# Patient Record
Sex: MALE | Race: BLACK OR AFRICAN AMERICAN | NOT HISPANIC OR LATINO | URBAN - METROPOLITAN AREA
[De-identification: names, ages, dates, MRNs, and addresses within clinical notes are randomized per-mention and may not be internally consistent; named-entity substitution may affect disease eponyms.]

---

## 2022-11-21 ENCOUNTER — LAB VISIT (OUTPATIENT)
Dept: LAB | Facility: HOSPITAL | Age: 32
End: 2022-11-21
Attending: FAMILY MEDICINE
Payer: COMMERCIAL

## 2022-11-21 ENCOUNTER — OFFICE VISIT (OUTPATIENT)
Dept: INTERNAL MEDICINE | Facility: CLINIC | Age: 32
End: 2022-11-21
Payer: COMMERCIAL

## 2022-11-21 VITALS
BODY MASS INDEX: 45.52 KG/M2 | RESPIRATION RATE: 18 BRPM | OXYGEN SATURATION: 99 % | HEART RATE: 66 BPM | HEIGHT: 69 IN | SYSTOLIC BLOOD PRESSURE: 164 MMHG | DIASTOLIC BLOOD PRESSURE: 108 MMHG | WEIGHT: 307.31 LBS

## 2022-11-21 DIAGNOSIS — Z00.00 ROUTINE PHYSICAL EXAMINATION: ICD-10-CM

## 2022-11-21 DIAGNOSIS — Z00.00 ROUTINE PHYSICAL EXAMINATION: Primary | ICD-10-CM

## 2022-11-21 DIAGNOSIS — Z11.59 ENCOUNTER FOR HEPATITIS C SCREENING TEST FOR LOW RISK PATIENT: ICD-10-CM

## 2022-11-21 DIAGNOSIS — Z00.00 ENCOUNTER FOR MEDICAL EXAMINATION TO ESTABLISH CARE: ICD-10-CM

## 2022-11-21 DIAGNOSIS — Z11.4 ENCOUNTER FOR SCREENING FOR HIV: ICD-10-CM

## 2022-11-21 DIAGNOSIS — I10 HYPERTENSION, UNSPECIFIED TYPE: ICD-10-CM

## 2022-11-21 DIAGNOSIS — Z23 NEED FOR TETANUS BOOSTER: ICD-10-CM

## 2022-11-21 LAB
ALBUMIN SERPL BCP-MCNC: 3.9 G/DL (ref 3.5–5.2)
ALP SERPL-CCNC: 70 U/L (ref 55–135)
ALT SERPL W/O P-5'-P-CCNC: 23 U/L (ref 10–44)
ANION GAP SERPL CALC-SCNC: 6 MMOL/L (ref 8–16)
AST SERPL-CCNC: 23 U/L (ref 10–40)
BASOPHILS # BLD AUTO: 0.06 K/UL (ref 0–0.2)
BASOPHILS NFR BLD: 1.1 % (ref 0–1.9)
BILIRUB SERPL-MCNC: 0.5 MG/DL (ref 0.1–1)
BUN SERPL-MCNC: 10 MG/DL (ref 6–20)
CALCIUM SERPL-MCNC: 9.1 MG/DL (ref 8.7–10.5)
CHLORIDE SERPL-SCNC: 107 MMOL/L (ref 95–110)
CHOLEST SERPL-MCNC: 184 MG/DL (ref 120–199)
CHOLEST/HDLC SERPL: 4.4 {RATIO} (ref 2–5)
CO2 SERPL-SCNC: 27 MMOL/L (ref 23–29)
CREAT SERPL-MCNC: 1 MG/DL (ref 0.5–1.4)
DIFFERENTIAL METHOD: ABNORMAL
EOSINOPHIL # BLD AUTO: 0.1 K/UL (ref 0–0.5)
EOSINOPHIL NFR BLD: 2.6 % (ref 0–8)
ERYTHROCYTE [DISTWIDTH] IN BLOOD BY AUTOMATED COUNT: 13.2 % (ref 11.5–14.5)
EST. GFR  (NO RACE VARIABLE): >60 ML/MIN/1.73 M^2
ESTIMATED AVG GLUCOSE: 111 MG/DL (ref 68–131)
GLUCOSE SERPL-MCNC: 87 MG/DL (ref 70–110)
HBA1C MFR BLD: 5.5 % (ref 4–5.6)
HCT VFR BLD AUTO: 42.6 % (ref 40–54)
HCV AB SERPL QL IA: NORMAL
HDLC SERPL-MCNC: 42 MG/DL (ref 40–75)
HDLC SERPL: 22.8 % (ref 20–50)
HGB BLD-MCNC: 13.8 G/DL (ref 14–18)
HIV 1+2 AB+HIV1 P24 AG SERPL QL IA: NORMAL
IMM GRANULOCYTES # BLD AUTO: 0.01 K/UL (ref 0–0.04)
IMM GRANULOCYTES NFR BLD AUTO: 0.2 % (ref 0–0.5)
LDLC SERPL CALC-MCNC: 130 MG/DL (ref 63–159)
LYMPHOCYTES # BLD AUTO: 2.1 K/UL (ref 1–4.8)
LYMPHOCYTES NFR BLD: 39.1 % (ref 18–48)
MCH RBC QN AUTO: 30 PG (ref 27–31)
MCHC RBC AUTO-ENTMCNC: 32.4 G/DL (ref 32–36)
MCV RBC AUTO: 93 FL (ref 82–98)
MONOCYTES # BLD AUTO: 0.4 K/UL (ref 0.3–1)
MONOCYTES NFR BLD: 8.1 % (ref 4–15)
NEUTROPHILS # BLD AUTO: 2.6 K/UL (ref 1.8–7.7)
NEUTROPHILS NFR BLD: 48.9 % (ref 38–73)
NONHDLC SERPL-MCNC: 142 MG/DL
NRBC BLD-RTO: 0 /100 WBC
PLATELET # BLD AUTO: 447 K/UL (ref 150–450)
PMV BLD AUTO: 9.9 FL (ref 9.2–12.9)
POTASSIUM SERPL-SCNC: 3.8 MMOL/L (ref 3.5–5.1)
PROT SERPL-MCNC: 7.6 G/DL (ref 6–8.4)
RBC # BLD AUTO: 4.6 M/UL (ref 4.6–6.2)
SODIUM SERPL-SCNC: 140 MMOL/L (ref 136–145)
TRIGL SERPL-MCNC: 60 MG/DL (ref 30–150)
WBC # BLD AUTO: 5.3 K/UL (ref 3.9–12.7)

## 2022-11-21 PROCEDURE — 85025 COMPLETE CBC W/AUTO DIFF WBC: CPT | Performed by: FAMILY MEDICINE

## 2022-11-21 PROCEDURE — 90714 TD VACC NO PRESV 7 YRS+ IM: CPT | Mod: S$GLB,,, | Performed by: FAMILY MEDICINE

## 2022-11-21 PROCEDURE — 99395 PREV VISIT EST AGE 18-39: CPT | Mod: 25,S$GLB,, | Performed by: FAMILY MEDICINE

## 2022-11-21 PROCEDURE — 90471 IMMUNIZATION ADMIN: CPT | Mod: S$GLB,,, | Performed by: FAMILY MEDICINE

## 2022-11-21 PROCEDURE — 3008F BODY MASS INDEX DOCD: CPT | Mod: CPTII,S$GLB,, | Performed by: FAMILY MEDICINE

## 2022-11-21 PROCEDURE — 90471 TD VACCINE GREATER THAN OR EQUAL TO 7YO PRESERVATIVE FREE IM: ICD-10-PCS | Mod: S$GLB,,, | Performed by: FAMILY MEDICINE

## 2022-11-21 PROCEDURE — 1159F PR MEDICATION LIST DOCUMENTED IN MEDICAL RECORD: ICD-10-PCS | Mod: CPTII,S$GLB,, | Performed by: FAMILY MEDICINE

## 2022-11-21 PROCEDURE — 3080F PR MOST RECENT DIASTOLIC BLOOD PRESSURE >= 90 MM HG: ICD-10-PCS | Mod: CPTII,S$GLB,, | Performed by: FAMILY MEDICINE

## 2022-11-21 PROCEDURE — 3008F PR BODY MASS INDEX (BMI) DOCUMENTED: ICD-10-PCS | Mod: CPTII,S$GLB,, | Performed by: FAMILY MEDICINE

## 2022-11-21 PROCEDURE — 36415 COLL VENOUS BLD VENIPUNCTURE: CPT | Performed by: FAMILY MEDICINE

## 2022-11-21 PROCEDURE — 3077F SYST BP >= 140 MM HG: CPT | Mod: CPTII,S$GLB,, | Performed by: FAMILY MEDICINE

## 2022-11-21 PROCEDURE — 3080F DIAST BP >= 90 MM HG: CPT | Mod: CPTII,S$GLB,, | Performed by: FAMILY MEDICINE

## 2022-11-21 PROCEDURE — 80061 LIPID PANEL: CPT | Performed by: FAMILY MEDICINE

## 2022-11-21 PROCEDURE — 3044F PR MOST RECENT HEMOGLOBIN A1C LEVEL <7.0%: ICD-10-PCS | Mod: CPTII,S$GLB,, | Performed by: FAMILY MEDICINE

## 2022-11-21 PROCEDURE — 3044F HG A1C LEVEL LT 7.0%: CPT | Mod: CPTII,S$GLB,, | Performed by: FAMILY MEDICINE

## 2022-11-21 PROCEDURE — 1159F MED LIST DOCD IN RCRD: CPT | Mod: CPTII,S$GLB,, | Performed by: FAMILY MEDICINE

## 2022-11-21 PROCEDURE — 86803 HEPATITIS C AB TEST: CPT | Performed by: FAMILY MEDICINE

## 2022-11-21 PROCEDURE — 90714 TD VACCINE GREATER THAN OR EQUAL TO 7YO PRESERVATIVE FREE IM: ICD-10-PCS | Mod: S$GLB,,, | Performed by: FAMILY MEDICINE

## 2022-11-21 PROCEDURE — 99999 PR PBB SHADOW E&M-NEW PATIENT-LVL III: ICD-10-PCS | Mod: PBBFAC,,, | Performed by: FAMILY MEDICINE

## 2022-11-21 PROCEDURE — 80053 COMPREHEN METABOLIC PANEL: CPT | Performed by: FAMILY MEDICINE

## 2022-11-21 PROCEDURE — 99999 PR PBB SHADOW E&M-NEW PATIENT-LVL III: CPT | Mod: PBBFAC,,, | Performed by: FAMILY MEDICINE

## 2022-11-21 PROCEDURE — 87389 HIV-1 AG W/HIV-1&-2 AB AG IA: CPT | Performed by: FAMILY MEDICINE

## 2022-11-21 PROCEDURE — 83036 HEMOGLOBIN GLYCOSYLATED A1C: CPT | Performed by: FAMILY MEDICINE

## 2022-11-21 PROCEDURE — 99395 PR PREVENTIVE VISIT,EST,18-39: ICD-10-PCS | Mod: 25,S$GLB,, | Performed by: FAMILY MEDICINE

## 2022-11-21 PROCEDURE — 3077F PR MOST RECENT SYSTOLIC BLOOD PRESSURE >= 140 MM HG: ICD-10-PCS | Mod: CPTII,S$GLB,, | Performed by: FAMILY MEDICINE

## 2022-12-15 NOTE — PROGRESS NOTES
Subjective:       Patient ID: Corona Hutchinson is a 32 y.o. male.    Chief Complaint: Establish Care    HPI Mr. Hutchinson presents today to establish care.     Notes elevated blood pressure in the past   Last month 135/75     Has been physically active on a regular basis but not recently     Feels good when he is working out regularly       Review of Systems   Constitutional:  Negative for activity change, appetite change, fatigue and fever.   HENT:  Negative for congestion, ear pain, facial swelling, hearing loss, sore throat and tinnitus.    Eyes:  Negative for redness and visual disturbance.   Respiratory:  Negative for cough, chest tightness and wheezing.    Gastrointestinal:  Negative for abdominal distention, abdominal pain, constipation, diarrhea, nausea and vomiting.   Endocrine: Negative for polydipsia and polyuria.   Genitourinary:  Negative for flank pain, frequency and penile discharge.   Musculoskeletal:  Negative for back pain, gait problem and joint swelling.   Skin:  Negative for rash.   Neurological:  Negative for dizziness, tremors, seizures, weakness and headaches.   Psychiatric/Behavioral:  Negative for agitation and confusion.          Past Medical History:   Diagnosis Date    Amblyopia      Past Surgical History:   Procedure Laterality Date    REDUCTION OF TESTICULAR TORSION       Family History   Problem Relation Age of Onset    Hypertension Mother      Social History     Socioeconomic History    Marital status:    Tobacco Use    Smoking status: Never    Smokeless tobacco: Never   Substance and Sexual Activity    Alcohol use: Not Currently     Comment: seldom    Drug use: Never    Sexual activity: Yes       Objective:        Physical Exam  Vitals reviewed.   Constitutional:       Appearance: He is obese. He is not ill-appearing.   HENT:      Head: Normocephalic and atraumatic.      Right Ear: External ear normal.      Left Ear: External ear normal.   Eyes:      General: No scleral icterus.         Right eye: No discharge.         Left eye: No discharge.      Pupils: Pupils are equal, round, and reactive to light.   Neck:      Thyroid: No thyromegaly.   Cardiovascular:      Rate and Rhythm: Normal rate and regular rhythm.      Heart sounds: Normal heart sounds. No murmur heard.  Pulmonary:      Effort: Pulmonary effort is normal. No respiratory distress.      Breath sounds: Normal breath sounds. No wheezing.   Abdominal:      General: Bowel sounds are normal. There is no distension.      Palpations: Abdomen is soft.      Tenderness: There is no abdominal tenderness.   Musculoskeletal:         General: Normal range of motion.      Cervical back: Normal range of motion and neck supple.   Skin:     General: Skin is warm.      Findings: No erythema or rash.   Neurological:      Mental Status: He is alert and oriented to person, place, and time. Mental status is at baseline.      Coordination: Coordination normal.      Deep Tendon Reflexes: Reflexes are normal and symmetric.   Psychiatric:         Mood and Affect: Mood normal.         Behavior: Behavior normal.         No results found for this or any previous visit.    Assessment/Plan:     Routine physical examination  -     Lipid Panel; Future; Expected date: 11/21/2022  -     COMPREHENSIVE METABOLIC PANEL; Future; Expected date: 11/21/2022  -     CBC Auto Differential; Future; Expected date: 11/21/2022  -     HEMOGLOBIN A1C; Future; Expected date: 11/21/2022    Encounter for screening for HIV  -     HIV 1 / 2 ANTIBODY; Future; Expected date: 11/21/2022    Encounter for hepatitis C screening test for low risk patient  -     Hepatitis C Antibody; Future; Expected date: 11/21/2022    Need for tetanus booster  -     (In Office Administered) Td Vaccine - Preservative Free    Encounter for medical examination to establish care    Hypertension, unspecified type    Suggest improving diet and discussed DASH diet today. The DASH diet incorporates eating vegetables,  fruits, and whole grains Including fat-free or low-fat dairy products, fish, poultry, beans, nuts, and vegetable oils. Limiting foods that are high in saturated fat, such as fatty meats, full-fat dairy products, and tropical oils such as coconut oil. Limiting sugar-sweetened beverages and sweets. Target BP is less than 140/90 if BP running higher than this please schedule an appointment to be seen. You may consider getting BP monitor at home or visiting local pharmacy to check BP at least 1 to 2 times a week.       Follow up 2 week nurse visit    Lainey Belle MD  ON   Family Medicine       Satisfactory

## 2025-02-05 ENCOUNTER — OFFICE VISIT (OUTPATIENT)
Dept: INTERNAL MEDICINE | Facility: CLINIC | Age: 35
End: 2025-02-05
Payer: COMMERCIAL

## 2025-02-05 ENCOUNTER — LAB VISIT (OUTPATIENT)
Dept: LAB | Facility: HOSPITAL | Age: 35
End: 2025-02-05
Payer: COMMERCIAL

## 2025-02-05 VITALS
OXYGEN SATURATION: 98 % | HEART RATE: 88 BPM | BODY MASS INDEX: 49.68 KG/M2 | DIASTOLIC BLOOD PRESSURE: 98 MMHG | WEIGHT: 315 LBS | TEMPERATURE: 98 F | SYSTOLIC BLOOD PRESSURE: 160 MMHG

## 2025-02-05 DIAGNOSIS — I10 UNCONTROLLED HYPERTENSION: Primary | ICD-10-CM

## 2025-02-05 DIAGNOSIS — I10 ESSENTIAL HYPERTENSION: ICD-10-CM

## 2025-02-05 DIAGNOSIS — Z79.899 OTHER LONG TERM (CURRENT) DRUG THERAPY: ICD-10-CM

## 2025-02-05 LAB
ALBUMIN SERPL BCP-MCNC: 3.9 G/DL (ref 3.5–5.2)
ALP SERPL-CCNC: 62 U/L (ref 40–150)
ALT SERPL W/O P-5'-P-CCNC: 25 U/L (ref 10–44)
ANION GAP SERPL CALC-SCNC: 10 MMOL/L (ref 8–16)
AST SERPL-CCNC: 27 U/L (ref 10–40)
BASOPHILS # BLD AUTO: 0.07 K/UL (ref 0–0.2)
BASOPHILS NFR BLD: 1.4 % (ref 0–1.9)
BILIRUB SERPL-MCNC: 0.5 MG/DL (ref 0.1–1)
BUN SERPL-MCNC: 15 MG/DL (ref 6–20)
CALCIUM SERPL-MCNC: 9.4 MG/DL (ref 8.7–10.5)
CHLORIDE SERPL-SCNC: 107 MMOL/L (ref 95–110)
CHOLEST SERPL-MCNC: 185 MG/DL (ref 120–199)
CHOLEST/HDLC SERPL: 5.8 {RATIO} (ref 2–5)
CO2 SERPL-SCNC: 25 MMOL/L (ref 23–29)
CREAT SERPL-MCNC: 1.2 MG/DL (ref 0.5–1.4)
DIFFERENTIAL METHOD BLD: ABNORMAL
EOSINOPHIL # BLD AUTO: 0.3 K/UL (ref 0–0.5)
EOSINOPHIL NFR BLD: 5 % (ref 0–8)
ERYTHROCYTE [DISTWIDTH] IN BLOOD BY AUTOMATED COUNT: 12.8 % (ref 11.5–14.5)
EST. GFR  (NO RACE VARIABLE): >60 ML/MIN/1.73 M^2
ESTIMATED AVG GLUCOSE: 114 MG/DL (ref 68–131)
GLUCOSE SERPL-MCNC: 88 MG/DL (ref 70–110)
HBA1C MFR BLD: 5.6 % (ref 4–5.6)
HCT VFR BLD AUTO: 41.8 % (ref 40–54)
HDLC SERPL-MCNC: 32 MG/DL (ref 40–75)
HDLC SERPL: 17.3 % (ref 20–50)
HGB BLD-MCNC: 13.9 G/DL (ref 14–18)
IMM GRANULOCYTES # BLD AUTO: 0.01 K/UL (ref 0–0.04)
IMM GRANULOCYTES NFR BLD AUTO: 0.2 % (ref 0–0.5)
LDLC SERPL CALC-MCNC: 123.2 MG/DL (ref 63–159)
LYMPHOCYTES # BLD AUTO: 1.7 K/UL (ref 1–4.8)
LYMPHOCYTES NFR BLD: 33.7 % (ref 18–48)
MCH RBC QN AUTO: 30.8 PG (ref 27–31)
MCHC RBC AUTO-ENTMCNC: 33.3 G/DL (ref 32–36)
MCV RBC AUTO: 93 FL (ref 82–98)
MONOCYTES # BLD AUTO: 0.8 K/UL (ref 0.3–1)
MONOCYTES NFR BLD: 15.3 % (ref 4–15)
NEUTROPHILS # BLD AUTO: 2.2 K/UL (ref 1.8–7.7)
NEUTROPHILS NFR BLD: 44.4 % (ref 38–73)
NONHDLC SERPL-MCNC: 153 MG/DL
NRBC BLD-RTO: 0 /100 WBC
PLATELET # BLD AUTO: 406 K/UL (ref 150–450)
PMV BLD AUTO: 9.8 FL (ref 9.2–12.9)
POTASSIUM SERPL-SCNC: 3.5 MMOL/L (ref 3.5–5.1)
PROT SERPL-MCNC: 8.1 G/DL (ref 6–8.4)
RBC # BLD AUTO: 4.51 M/UL (ref 4.6–6.2)
SODIUM SERPL-SCNC: 142 MMOL/L (ref 136–145)
TRIGL SERPL-MCNC: 149 MG/DL (ref 30–150)
TSH SERPL DL<=0.005 MIU/L-ACNC: 0.96 UIU/ML (ref 0.4–4)
WBC # BLD AUTO: 5.02 K/UL (ref 3.9–12.7)

## 2025-02-05 PROCEDURE — 80053 COMPREHEN METABOLIC PANEL: CPT

## 2025-02-05 PROCEDURE — 4010F ACE/ARB THERAPY RXD/TAKEN: CPT | Mod: CPTII,S$GLB,,

## 2025-02-05 PROCEDURE — G2211 COMPLEX E/M VISIT ADD ON: HCPCS | Mod: S$GLB,,,

## 2025-02-05 PROCEDURE — 3080F DIAST BP >= 90 MM HG: CPT | Mod: CPTII,S$GLB,,

## 2025-02-05 PROCEDURE — 99999 PR PBB SHADOW E&M-EST. PATIENT-LVL III: CPT | Mod: PBBFAC,,,

## 2025-02-05 PROCEDURE — 3044F HG A1C LEVEL LT 7.0%: CPT | Mod: CPTII,S$GLB,,

## 2025-02-05 PROCEDURE — 36415 COLL VENOUS BLD VENIPUNCTURE: CPT | Mod: PO

## 2025-02-05 PROCEDURE — 83036 HEMOGLOBIN GLYCOSYLATED A1C: CPT

## 2025-02-05 PROCEDURE — 1159F MED LIST DOCD IN RCRD: CPT | Mod: CPTII,S$GLB,,

## 2025-02-05 PROCEDURE — 80061 LIPID PANEL: CPT

## 2025-02-05 PROCEDURE — 84443 ASSAY THYROID STIM HORMONE: CPT

## 2025-02-05 PROCEDURE — 3008F BODY MASS INDEX DOCD: CPT | Mod: CPTII,S$GLB,,

## 2025-02-05 PROCEDURE — 99204 OFFICE O/P NEW MOD 45 MIN: CPT | Mod: S$GLB,,,

## 2025-02-05 PROCEDURE — 3077F SYST BP >= 140 MM HG: CPT | Mod: CPTII,S$GLB,,

## 2025-02-05 PROCEDURE — 85025 COMPLETE CBC W/AUTO DIFF WBC: CPT

## 2025-02-05 RX ORDER — LOSARTAN POTASSIUM 100 MG/1
100 TABLET ORAL DAILY
Qty: 90 TABLET | Refills: 0 | Status: SHIPPED | OUTPATIENT
Start: 2025-02-05 | End: 2025-05-06

## 2025-02-05 RX ORDER — AMLODIPINE BESYLATE 10 MG/1
10 TABLET ORAL DAILY
COMMUNITY
End: 2025-02-05 | Stop reason: SDUPTHER

## 2025-02-05 RX ORDER — AMLODIPINE BESYLATE 10 MG/1
10 TABLET ORAL DAILY
Qty: 90 TABLET | Refills: 0 | Status: SHIPPED | OUTPATIENT
Start: 2025-02-05 | End: 2025-05-06

## 2025-02-05 RX ORDER — LOSARTAN POTASSIUM 50 MG/1
50 TABLET ORAL DAILY
COMMUNITY
End: 2025-02-05 | Stop reason: SDUPTHER

## 2025-02-06 NOTE — PROGRESS NOTES
Patient ID: Corona Hutchinson is a 34 y.o. male.    Chief Complaint: Establish Care (Discuss hypertension )    History of Present Illness    CHIEF COMPLAINT:  - Mr. Hutchinson presents for follow-up of hypertension with concerns about elevated blood pressure readings.    HPI:  Mr. Hutchinson reports elevated blood pressure readings recently, with a reading close to 180 mmHg systolic during a drive earlier in the day. Home monitoring usually shows readings below 140 mmHg, occasionally rising slightly above this level. Mr. Hutchinson has been taking amlodipine 10 mg and losartan for about a year to manage hypertension. A previous physician had started these medications and considered weaning the patient off, but it became necessary to resume the regimen. Mr. Hutchinson attributes the elevated reading to a long and tiring day.    Mr. Hutchinson denies chest pain, shortness of breath, blurring of vision, weakness in hands or feet, or any other concerning symptoms associated with high blood pressure.      ROS:  General: -fever, -chills, -fatigue, -weight gain, -weight loss  Eyes: -vision changes, -redness, -discharge  ENT: -ear pain, -nasal congestion, -sore throat  Cardiovascular: -chest pain, -palpitations, -lower extremity edema  Respiratory: -cough, -shortness of breath  Gastrointestinal: -abdominal pain, -nausea, -vomiting, -diarrhea, -constipation, -blood in stool  Genitourinary: -dysuria, -hematuria, -frequency  Musculoskeletal: -joint pain, -muscle pain  Skin: -rash, -lesion  Neurological: -headache, -dizziness, -numbness, -tingling, -weakness  Psychiatric: -anxiety, -depression, -sleep difficulty         Pmh, Psh, Family Hx, Social Hx updated in Epic Tabs today.         2/5/2025     3:35 PM   Depression Patient Health Questionnaire   Over the last two weeks how often have you been bothered by little interest or pleasure in doing things Not at all   Over the last two weeks how often have you been bothered by feeling down, depressed or hopeless  Not at all   PHQ-2 Total Score 0       There are no active problems to display for this patient.      Past Medical History:   Diagnosis Date    Amblyopia        Past Surgical History:   Procedure Laterality Date    REDUCTION OF TESTICULAR TORSION         Family History   Problem Relation Name Age of Onset    Hypertension Mother         Social History     Socioeconomic History    Marital status:    Tobacco Use    Smoking status: Never    Smokeless tobacco: Never   Substance and Sexual Activity    Alcohol use: Not Currently     Comment: seldom    Drug use: Never    Sexual activity: Yes       No current outpatient medications on file prior to visit.     No current facility-administered medications on file prior to visit.       Review of patient's allergies indicates:  No Known Allergies    General - Well developed, alert and oriented in NAD  HEENT - normocephalic, no evidence of trauma, sclera white, EOMI  Neck - full range of motion  COR - regular rate and rhythm without murmurs or gallops  Lungs - Clear  Abdomen - soft, non-tender  Ext - no cyanosis or edema     Assessment:     1. Uncontrolled hypertension    2. Essential hypertension    3. Other long term (current) drug therapy        Pertinent Labs:    Chemistry        Component Value Date/Time     02/05/2025 1604    K 3.5 02/05/2025 1604     02/05/2025 1604    CO2 25 02/05/2025 1604    BUN 15 02/05/2025 1604    CREATININE 1.2 02/05/2025 1604    GLU 88 02/05/2025 1604        Component Value Date/Time    CALCIUM 9.4 02/05/2025 1604    ALKPHOS 62 02/05/2025 1604    AST 27 02/05/2025 1604    ALT 25 02/05/2025 1604    BILITOT 0.5 02/05/2025 1604          Lab Results   Component Value Date    WBC 5.02 02/05/2025    HGB 13.9 (L) 02/05/2025    HCT 41.8 02/05/2025    MCV 93 02/05/2025    MCH 30.8 02/05/2025    MCHC 33.3 02/05/2025    RDW 12.8 02/05/2025     02/05/2025    MPV 9.8 02/05/2025       Lab Results   Component Value Date    HGBA1C 5.6  "02/05/2025    HGBA1C 5.5 11/21/2022    GLU 88 02/05/2025     Lab Results   Component Value Date    LDLCALC 123.2 02/05/2025     Lab Results   Component Value Date    TSH 0.956 02/05/2025     Lab Results   Component Value Date    CHOL 185 02/05/2025    CHOL 184 11/21/2022     Lab Results   Component Value Date    TRIG 149 02/05/2025    TRIG 60 11/21/2022     Lab Results   Component Value Date    HDL 32 (L) 02/05/2025    HDL 42 11/21/2022     Lab Results   Component Value Date    LDLCALC 123.2 02/05/2025    LDLCALC 130.0 11/21/2022     No results found for: "NONHDLC"  Lab Results   Component Value Date    CHOLHDL 17.3 (L) 02/05/2025    CHOLHDL 22.8 11/21/2022       The ASCVD Risk score (Sindy DK, et al., 2019) failed to calculate for the following reasons:    The 2019 ASCVD risk score is only valid for ages 40 to 79    Plan:     Assessment & Plan    I10 Essential (primary) hypertension    I10 ESSENTIAL (PRIMARY) HYPERTENSION:  - Assessed elevated blood pressure, with the patient reporting home readings up to 180 systolic.  - Evaluated the patient's current antihypertensive regimen, which includes amlodipine 10 mg and losartan.  - Increased losartan dose from 50 mg to 100 mg daily due to persistent elevation.  - Instructed the patient to take an additional 50 mg losartan today, then start 100 mg daily from tomorrow.  - Continued amlodipine 10 mg daily.  - Considered adding a potential 3rd medication if blood pressure remains uncontrolled after dose adjustment.  - Explained that target blood pressure should be less than 140/90 mmHg on average.  - Discussed the importance of home blood pressure monitoring and using averages over 2-4 weeks for assessment.  - Informed the patient about the 7-10 day period for blood pressure to normalize after medication adjustments.  - Advised monitoring blood pressure at home for the next 3-4 weeks, aiming for an average of less than 140/90.  - Educated the patient on lifestyle " modifications including low sodium diet, avoiding high sugar foods, and exercising for at least 30 minutes a day, 5 days a week at moderate intensity.  - Ordered labs including CBC, kidney function, liver function, electrolytes (potassium, sodium), glucose levels, and diabetes markers.  - Scheduled a follow-up visit in 3 weeks.  - Instructed the patient to contact the office if blood pressure remains consistently elevated or if experiencing symptoms such as chest pain, dyspnea, blurred vision, or weakness in hands or feet.         1. Uncontrolled hypertension  - losartan (COZAAR) 100 MG tablet; Take 1 tablet (100 mg total) by mouth once daily.  Dispense: 90 tablet; Refill: 0  - amLODIPine (NORVASC) 10 MG tablet; Take 1 tablet (10 mg total) by mouth once daily.  Dispense: 90 tablet; Refill: 0  - CBC Auto Differential; Future  - COMPREHENSIVE METABOLIC PANEL; Future    2. Essential hypertension  - losartan (COZAAR) 100 MG tablet; Take 1 tablet (100 mg total) by mouth once daily.  Dispense: 90 tablet; Refill: 0  - amLODIPine (NORVASC) 10 MG tablet; Take 1 tablet (10 mg total) by mouth once daily.  Dispense: 90 tablet; Refill: 0    3. Other long term (current) drug therapy  - CBC Auto Differential; Future  - COMPREHENSIVE METABOLIC PANEL; Future  - Hemoglobin A1C; Future  - TSH; Future  - Lipid Panel; Future      Immunization History   Administered Date(s) Administered    Td - PF (ADULT) 11/21/2022       Orders Placed This Encounter   Procedures    CBC Auto Differential    COMPREHENSIVE METABOLIC PANEL    Hemoglobin A1C    TSH    Lipid Panel       Portions of this note were generated by [employer].    Each patient to whom medical services by telemedicine are provided:  (1) informed of the relationship between the physician and patient and the respective role of any other health care provider with respect to management of the patient; and (2) notified that he or she may decline to receive medical services by telemedicine  and may withdraw from such care at any time.    I spent a total of 45 minutes face to face and non-face to face on the date of this visit.This includes time preparing to see the patient (eg, review of tests, notes), obtaining and/or reviewing additional history from an independent historian and/or outside medical records, documenting clinical information in the electronic health record, independently interpreting results and/or communicating results to the patient/family/caregiver, or care coordinator.  Visit today included increased complexity associated with the care of the episodic problem addressed and managing the longitudinal care of the patient due to the serious and/or complex managed problem(s).      This note was generated with the assistance of ambient listening technology. Verbal consent was obtained by the patient and accompanying visitor(s) for the recording of patient appointment to facilitate this note. I attest to having reviewed and edited the generated note for accuracy, though some syntax or spelling errors may persist. Please contact the author of this note for any clarification.      Noman Byrd MD

## 2025-02-12 ENCOUNTER — OFFICE VISIT (OUTPATIENT)
Dept: INTERNAL MEDICINE | Facility: CLINIC | Age: 35
End: 2025-02-12
Payer: COMMERCIAL

## 2025-02-12 VITALS
SYSTOLIC BLOOD PRESSURE: 172 MMHG | OXYGEN SATURATION: 99 % | HEART RATE: 81 BPM | TEMPERATURE: 97 F | HEIGHT: 69 IN | BODY MASS INDEX: 46.65 KG/M2 | DIASTOLIC BLOOD PRESSURE: 102 MMHG | WEIGHT: 315 LBS

## 2025-02-12 DIAGNOSIS — I10 ESSENTIAL HYPERTENSION: Primary | ICD-10-CM

## 2025-02-12 DIAGNOSIS — I10 UNCONTROLLED HYPERTENSION: ICD-10-CM

## 2025-02-12 PROCEDURE — 3008F BODY MASS INDEX DOCD: CPT | Mod: CPTII,S$GLB,,

## 2025-02-12 PROCEDURE — 3077F SYST BP >= 140 MM HG: CPT | Mod: CPTII,S$GLB,,

## 2025-02-12 PROCEDURE — G2211 COMPLEX E/M VISIT ADD ON: HCPCS | Mod: S$GLB,,,

## 2025-02-12 PROCEDURE — 4010F ACE/ARB THERAPY RXD/TAKEN: CPT | Mod: CPTII,S$GLB,,

## 2025-02-12 PROCEDURE — 99214 OFFICE O/P EST MOD 30 MIN: CPT | Mod: S$GLB,,,

## 2025-02-12 PROCEDURE — 99999 PR PBB SHADOW E&M-EST. PATIENT-LVL III: CPT | Mod: PBBFAC,,,

## 2025-02-12 PROCEDURE — 3080F DIAST BP >= 90 MM HG: CPT | Mod: CPTII,S$GLB,,

## 2025-02-12 PROCEDURE — 3044F HG A1C LEVEL LT 7.0%: CPT | Mod: CPTII,S$GLB,,

## 2025-02-12 PROCEDURE — 1159F MED LIST DOCD IN RCRD: CPT | Mod: CPTII,S$GLB,,

## 2025-02-12 RX ORDER — CHLORTHALIDONE 50 MG/1
50 TABLET ORAL DAILY
Qty: 30 TABLET | Refills: 1 | Status: SHIPPED | OUTPATIENT
Start: 2025-02-12 | End: 2026-02-12

## 2025-02-12 RX ORDER — HYDRALAZINE HYDROCHLORIDE 10 MG/1
10 TABLET, FILM COATED ORAL 3 TIMES DAILY
Qty: 90 TABLET | Refills: 0 | Status: SHIPPED | OUTPATIENT
Start: 2025-02-12 | End: 2025-03-14

## 2025-02-12 RX ORDER — CHLORTHALIDONE 25 MG/1
25 TABLET ORAL DAILY
Qty: 30 TABLET | Refills: 1 | Status: SHIPPED | OUTPATIENT
Start: 2025-02-12 | End: 2025-02-12

## 2025-02-12 NOTE — PROGRESS NOTES
Patient ID: Corona Hutchinson is a 34 y.o. male.    Chief Complaint: No chief complaint on file.    History of Present Illness    CHIEF COMPLAINT:  - Mr. 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    HPI:  Mr. Hutchinson reports consistently elevated blood pressure, approximately 160 mmHg systolic, which he has been monitoring twice daily at home. His blood pressure is particularly elevated (180 mmHg systolic) today due to receiving distressing news. Mr. 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 He has attempted to manage his blood pressure since his last visit and wishes to address the issue promptly, suggesting a higher initial dose of medication to rapidly reduce his blood pressure. Mr. Hutchinson denies shortness of breath or any other symptoms related to his hypertension, as well as kidney issues, liver problems, diabetes, or concerning cholesterol levels.      ROS:  General: -fever, -chills, -fatigue, -weight gain, -weight loss  Eyes: -vision changes, -redness, -discharge  ENT: -ear pain, -nasal congestion, -sore throat  Cardiovascular: -chest pain, -palpitations, -lower extremity edema  Respiratory: -cough, -shortness of breath  Gastrointestinal: -abdominal pain, -nausea, -vomiting, -diarrhea, -constipation, -blood in stool  Genitourinary: -dysuria, -hematuria, -frequency  Musculoskeletal: -joint pain, -muscle pain  Skin: -rash, -lesion  Neurological: -headache, -dizziness, -numbness, -tingling  Psychiatric: -anxiety, -depression, -sleep difficulty         Pmh, Psh, Family Hx, Social Hx updated in Epic Tabs today.         2/5/2025     3:35 PM   Depression Patient Health Questionnaire   Over the last two weeks how often have you been bothered by little interest or pleasure in doing things Not at all   Over the last two weeks how often have you been bothered by  feeling down, depressed or hopeless Not at all   PHQ-2 Total Score 0       There are no active problems to display for this patient.      Past Medical History:   Diagnosis Date    Amblyopia        Past Surgical History:   Procedure Laterality Date    REDUCTION OF TESTICULAR TORSION         Family History   Problem Relation Name Age of Onset    Hypertension Mother         Social History     Socioeconomic History    Marital status:    Tobacco Use    Smoking status: Never    Smokeless tobacco: Never   Substance and Sexual Activity    Alcohol use: Not Currently     Comment: seldom    Drug use: Never    Sexual activity: Yes     Social Drivers of Health     Financial Resource Strain: Low Risk  (2/12/2025)    Overall Financial Resource Strain (CARDIA)     Difficulty of Paying Living Expenses: Not very hard   Food Insecurity: No Food Insecurity (2/12/2025)    Hunger Vital Sign     Worried About Running Out of Food in the Last Year: Never true     Ran Out of Food in the Last Year: Never true   Transportation Needs: No Transportation Needs (2/12/2025)    PRAPARE - Transportation     Lack of Transportation (Medical): No     Lack of Transportation (Non-Medical): No   Physical Activity: Sufficiently Active (2/12/2025)    Exercise Vital Sign     Days of Exercise per Week: 5 days     Minutes of Exercise per Session: 60 min   Stress: No Stress Concern Present (2/12/2025)    [employer] - Occupational Stress Questionnaire     Feeling of Stress : Not at all   Housing Stability: Low Risk  (2/12/2025)    Housing Stability Vital Sign     Unable to Pay for Housing in the Last Year: No     Homeless in the Last Year: No       Current Outpatient Medications on File Prior to Visit   Medication Sig Dispense Refill    amLODIPine (NORVASC) 10 MG tablet Take 1 tablet (10 mg total) by mouth once daily. 90 tablet 0    losartan (COZAAR) 100 MG tablet Take 1 tablet (100 mg total) by mouth once daily. 90 tablet 0  "    No current facility-administered medications on file prior to visit.       Review of patient's allergies indicates:  No Known Allergies    General - Well developed, alert and oriented in NAD  HEENT - normocephalic, no evidence of trauma, sclera white, EOMI  Neck - full range of motion  COR - regular rate and rhythm without murmurs or gallops  Lungs - Clear  Abdomen - soft, non-tender  Ext - no cyanosis or edema     Assessment:     1. Essential hypertension    2. Uncontrolled hypertension        Pertinent Labs:    Chemistry        Component Value Date/Time     02/05/2025 1604    K 3.5 02/05/2025 1604     02/05/2025 1604    CO2 25 02/05/2025 1604    BUN 15 02/05/2025 1604    CREATININE 1.2 02/05/2025 1604    GLU 88 02/05/2025 1604        Component Value Date/Time    CALCIUM 9.4 02/05/2025 1604    ALKPHOS 62 02/05/2025 1604    AST 27 02/05/2025 1604    ALT 25 02/05/2025 1604    BILITOT 0.5 02/05/2025 1604          Lab Results   Component Value Date    WBC 5.02 02/05/2025    HGB 13.9 (L) 02/05/2025    HCT 41.8 02/05/2025    MCV 93 02/05/2025    MCH 30.8 02/05/2025    MCHC 33.3 02/05/2025    RDW 12.8 02/05/2025     02/05/2025    MPV 9.8 02/05/2025       Lab Results   Component Value Date    HGBA1C 5.6 02/05/2025    HGBA1C 5.5 11/21/2022    GLU 88 02/05/2025     Lab Results   Component Value Date    LDLCALC 123.2 02/05/2025     Lab Results   Component Value Date    TSH 0.956 02/05/2025     Lab Results   Component Value Date    CHOL 185 02/05/2025    CHOL 184 11/21/2022     Lab Results   Component Value Date    TRIG 149 02/05/2025    TRIG 60 11/21/2022     Lab Results   Component Value Date    HDL 32 (L) 02/05/2025    HDL 42 11/21/2022     Lab Results   Component Value Date    LDLCALC 123.2 02/05/2025    LDLCALC 130.0 11/21/2022     No results found for: "NONHDLC"  Lab Results   Component Value Date    CHOLHDL 17.3 (L) 02/05/2025    CHOLHDL 22.8 11/21/2022       The ASCVD Risk score (Sindy THAO, et " al., 2019) failed to calculate for the following reasons:    The 2019 ASCVD risk score is only valid for ages 40 to 79    Plan:     Assessment & Plan    I16.0 Hypertensive urgency  I10 Essential (primary) hypertension    I16.0 HYPERTENSIVE URGENCY:  - Assessed the patient's persistently elevated blood pressure (BP) around 160 mmHg, indicating need for medication adjustment.  - Discussed classification of hypertensive urgency vs. emergency and associated symptoms.  - Advised on blood pressure thresholds for seeking emergency care (>220 mmHg systolic).  - Explained gradual approach to BP reduction to avoid complications from rapid lowering.  - Instructed the patient to check blood pressure twice daily at home.  - Recommend rest and avoiding strenuous activity when blood pressure is over 180 mmHg.  - Advised to seek emergency care if blood pressure exceeds 200 mmHg.    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  - Assessed current blood pressure at 160 mmHg, which is considered high.  - Target blood pressure should be less than 140 mmHg.  - Reviewed patient's kidney and liver function, noting no issues.  - Assessed cholesterol levels, determining no significant concern.  - Opted for chlorthalidone as a 3rd BP medication due to American Heart Association recommendations for superior efficacy.  - Prescribed chlorthalidone 50mg daily.  - Instructed to take half tablet (25mg) for a few days to assess tolerability, then increase to full 50mg dose.  - Prescribed hydralazine 10mg 3 times daily as a 4th medication due to patient's occupational concerns.  - Continued amlodipine at current dose.  - Continued losartan at current dose.  - Recommend lifestyle changes, including weight loss, diet modifications (DASH diet), and exercise to help control blood pressure.  - Advised avoiding sodas and certain  foods to help control blood pressure.  - Instructed to continue monitoring blood pressure at home.  - Scheduled follow up in 5-6 weeks to assess medication efficacy.         1. Essential hypertension  - chlorthalidone (HYGROTEN) 50 MG Tab; Take 1 tablet (50 mg total) by mouth once daily.  Dispense: 30 tablet; Refill: 1  - hydrALAZINE (APRESOLINE) 10 MG tablet; Take 1 tablet (10 mg total) by mouth 3 (three) times daily.  Dispense: 90 tablet; Refill: 0    2. Uncontrolled hypertension  - chlorthalidone (HYGROTEN) 50 MG Tab; Take 1 tablet (50 mg total) by mouth once daily.  Dispense: 30 tablet; Refill: 1  - hydrALAZINE (APRESOLINE) 10 MG tablet; Take 1 tablet (10 mg total) by mouth 3 (three) times daily.  Dispense: 90 tablet; Refill: 0    HTN:  Amlodipine 10 mg daily, losartan 100 mg daily, chlorthalidone 50 mg daily, hydralazine 10 mg tid.    Immunization History   Administered Date(s) Administered    Td - PF (ADULT) 11/21/2022       No orders of the defined types were placed in this encounter.      Portions of this note were generated by [employer].    Each patient to whom medical services by telemedicine are provided:  (1) informed of the relationship between the physician and patient and the respective role of any other health care provider with respect to management of the patient; and (2) notified that he or she may decline to receive medical services by telemedicine and may withdraw from such care at any time.    I spent a total of 35 minutes face to face and non-face to face on the date of this visit.This includes time preparing to see the patient (eg, review of tests, notes), obtaining and/or reviewing additional history from an independent historian and/or outside medical records, documenting clinical information in the electronic health record, independently interpreting results and/or communicating results to the patient/family/caregiver, or care coordinator.  Visit today included increased complexity  associated with the care of the episodic problem addressed and managing the longitudinal care of the patient due to the serious and/or complex managed problem(s).      This note was generated with the assistance of ambient listening technology. Verbal consent was obtained by the patient and accompanying visitor(s) for the recording of patient appointment to facilitate this note. I attest to having reviewed and edited the generated note for accuracy, though some syntax or spelling errors may persist. Please contact the author of this note for any clarification.      Noman Byrd MD

## 2025-03-25 DIAGNOSIS — I10 UNCONTROLLED HYPERTENSION: ICD-10-CM

## 2025-03-25 DIAGNOSIS — I10 ESSENTIAL HYPERTENSION: ICD-10-CM

## 2025-03-25 RX ORDER — HYDRALAZINE HYDROCHLORIDE 10 MG/1
10 TABLET, FILM COATED ORAL 3 TIMES DAILY
Qty: 90 TABLET | Refills: 0 | Status: SHIPPED | OUTPATIENT
Start: 2025-03-25 | End: 2025-04-24

## 2025-03-31 ENCOUNTER — OFFICE VISIT (OUTPATIENT)
Dept: INTERNAL MEDICINE | Facility: CLINIC | Age: 35
End: 2025-03-31
Payer: COMMERCIAL

## 2025-03-31 VITALS
DIASTOLIC BLOOD PRESSURE: 81 MMHG | OXYGEN SATURATION: 99 % | SYSTOLIC BLOOD PRESSURE: 153 MMHG | HEIGHT: 69 IN | BODY MASS INDEX: 46.65 KG/M2 | HEART RATE: 70 BPM | WEIGHT: 315 LBS | TEMPERATURE: 96 F

## 2025-03-31 DIAGNOSIS — I10 UNCONTROLLED HYPERTENSION: ICD-10-CM

## 2025-03-31 DIAGNOSIS — I10 ESSENTIAL HYPERTENSION: Primary | ICD-10-CM

## 2025-03-31 PROCEDURE — 3044F HG A1C LEVEL LT 7.0%: CPT | Mod: CPTII,S$GLB,,

## 2025-03-31 PROCEDURE — 99214 OFFICE O/P EST MOD 30 MIN: CPT | Mod: S$GLB,,,

## 2025-03-31 PROCEDURE — 3008F BODY MASS INDEX DOCD: CPT | Mod: CPTII,S$GLB,,

## 2025-03-31 PROCEDURE — 99999 PR PBB SHADOW E&M-EST. PATIENT-LVL III: CPT | Mod: PBBFAC,,,

## 2025-03-31 PROCEDURE — G2211 COMPLEX E/M VISIT ADD ON: HCPCS | Mod: S$GLB,,,

## 2025-03-31 PROCEDURE — 3079F DIAST BP 80-89 MM HG: CPT | Mod: CPTII,S$GLB,,

## 2025-03-31 PROCEDURE — 4010F ACE/ARB THERAPY RXD/TAKEN: CPT | Mod: CPTII,S$GLB,,

## 2025-03-31 PROCEDURE — 1159F MED LIST DOCD IN RCRD: CPT | Mod: CPTII,S$GLB,,

## 2025-03-31 PROCEDURE — 3077F SYST BP >= 140 MM HG: CPT | Mod: CPTII,S$GLB,,

## 2025-03-31 RX ORDER — AMLODIPINE BESYLATE 10 MG/1
10 TABLET ORAL DAILY
Qty: 90 TABLET | Refills: 3 | Status: SHIPPED | OUTPATIENT
Start: 2025-03-31 | End: 2026-03-26

## 2025-03-31 RX ORDER — HYDRALAZINE HYDROCHLORIDE 25 MG/1
25 TABLET, FILM COATED ORAL 3 TIMES DAILY
Qty: 90 TABLET | Refills: 2 | Status: SHIPPED | OUTPATIENT
Start: 2025-03-31 | End: 2026-03-31

## 2025-03-31 RX ORDER — CHLORTHALIDONE 50 MG/1
50 TABLET ORAL DAILY
Qty: 90 TABLET | Refills: 3 | Status: SHIPPED | OUTPATIENT
Start: 2025-03-31 | End: 2026-03-31

## 2025-03-31 RX ORDER — LOSARTAN POTASSIUM 100 MG/1
100 TABLET ORAL DAILY
Qty: 90 TABLET | Refills: 3 | Status: SHIPPED | OUTPATIENT
Start: 2025-03-31 | End: 2026-03-26

## 2025-03-31 NOTE — PROGRESS NOTES
Patient ID: Corona Hutchinson is a 35 y.o. male.    Chief Complaint: Follow-up    History of Present Illness    CHIEF COMPLAINT:  - Mr. Hutchinson presents for follow-up on hypertension management and medication refills.    HPI:  Mr. Hutchinson has been off hydralazine for about a week due to pharmacy and insurance issues. His blood pressure was previously elevated at 160 and has been gradually decreasing with medication. His most recent blood pressure readings were in the high 140s. He is currently taking multiple medications for hypertension, including amlodipine, triamterene, and losartan. A sleep study within the last 6 months showed approximately 2 occurrences of sleep disturbance per hour, not considered significant for sleep apnea. He reports minimal snoring at night and denies significant sleep apnea.      ROS:  General: -fever, -chills, -fatigue, -weight gain, -weight loss  Eyes: -vision changes, -redness, -discharge  ENT: -ear pain, -nasal congestion, -sore throat  Cardiovascular: -chest pain, -palpitations, -lower extremity edema  Respiratory: -cough, -shortness of breath, +snoring  Gastrointestinal: -abdominal pain, -nausea, -vomiting, -diarrhea, -constipation, -blood in stool  Genitourinary: -dysuria, -hematuria, -frequency  Musculoskeletal: -joint pain, -muscle pain  Skin: -rash, -lesion  Neurological: -headache, -dizziness, -numbness, -tingling  Psychiatric: -anxiety, -depression, -sleep difficulty         Pmh, Psh, Family Hx, Social Hx updated in Epic Tabs today.         3/31/2025     8:30 AM 2/5/2025     3:35 PM   Depression Patient Health Questionnaire   Over the last two weeks how often have you been bothered by little interest or pleasure in doing things Not at all Not at all   Over the last two weeks how often have you been bothered by feeling down, depressed or hopeless Not at all Not at all   PHQ-2 Total Score 0 0       Active Problem List with Overview Notes    Diagnosis Date Noted    Essential hypertension  03/31/2025       Past Medical History:   Diagnosis Date    Amblyopia        Past Surgical History:   Procedure Laterality Date    REDUCTION OF TESTICULAR TORSION         Family History   Problem Relation Name Age of Onset    Hypertension Mother         Social History     Socioeconomic History    Marital status:    Tobacco Use    Smoking status: Never    Smokeless tobacco: Never   Substance and Sexual Activity    Alcohol use: Not Currently     Comment: seldom    Drug use: Never    Sexual activity: Yes     Social Drivers of Health     Financial Resource Strain: Low Risk  (2/12/2025)    Overall Financial Resource Strain (CARDIA)     Difficulty of Paying Living Expenses: Not very hard   Food Insecurity: No Food Insecurity (2/12/2025)    Hunger Vital Sign     Worried About Running Out of Food in the Last Year: Never true     Ran Out of Food in the Last Year: Never true   Transportation Needs: No Transportation Needs (2/12/2025)    PRAPARE - Transportation     Lack of Transportation (Medical): No     Lack of Transportation (Non-Medical): No   Physical Activity: Sufficiently Active (2/12/2025)    Exercise Vital Sign     Days of Exercise per Week: 5 days     Minutes of Exercise per Session: 60 min   Stress: No Stress Concern Present (2/12/2025)    [employer] - Occupational Stress Questionnaire     Feeling of Stress : Not at all   Housing Stability: Low Risk  (2/12/2025)    Housing Stability Vital Sign     Unable to Pay for Housing in the Last Year: No     Homeless in the Last Year: No       Medications Ordered Prior to Encounter[1]    Review of patient's allergies indicates:  No Known Allergies    General - Well developed, alert and oriented in NAD  HEENT - normocephalic, no evidence of trauma, sclera white, EOMI  Neck - full range of motion  COR - regular rate and rhythm without murmurs or gallops  Lungs - Clear  Abdomen - soft, non-tender  Ext - no cyanosis or edema     Assessment:  "    1. Essential hypertension    2. Uncontrolled hypertension        Pertinent Labs:    Chemistry        Component Value Date/Time     02/05/2025 1604    K 3.5 02/05/2025 1604     02/05/2025 1604    CO2 25 02/05/2025 1604    BUN 15 02/05/2025 1604    CREATININE 1.2 02/05/2025 1604    GLU 88 02/05/2025 1604        Component Value Date/Time    CALCIUM 9.4 02/05/2025 1604    ALKPHOS 62 02/05/2025 1604    AST 27 02/05/2025 1604    ALT 25 02/05/2025 1604    BILITOT 0.5 02/05/2025 1604          Lab Results   Component Value Date    WBC 5.02 02/05/2025    HGB 13.9 (L) 02/05/2025    HCT 41.8 02/05/2025    MCV 93 02/05/2025    MCH 30.8 02/05/2025    MCHC 33.3 02/05/2025    RDW 12.8 02/05/2025     02/05/2025    MPV 9.8 02/05/2025       Lab Results   Component Value Date    HGBA1C 5.6 02/05/2025    HGBA1C 5.5 11/21/2022    GLU 88 02/05/2025     Lab Results   Component Value Date    LDLCALC 123.2 02/05/2025     Lab Results   Component Value Date    TSH 0.956 02/05/2025     Lab Results   Component Value Date    CHOL 185 02/05/2025    CHOL 184 11/21/2022     Lab Results   Component Value Date    TRIG 149 02/05/2025    TRIG 60 11/21/2022     Lab Results   Component Value Date    HDL 32 (L) 02/05/2025    HDL 42 11/21/2022     Lab Results   Component Value Date    LDLCALC 123.2 02/05/2025    LDLCALC 130.0 11/21/2022     No results found for: "NONHDLC"  Lab Results   Component Value Date    CHOLHDL 17.3 (L) 02/05/2025    CHOLHDL 22.8 11/21/2022       The ASCVD Risk score (Sindy DK, et al., 2019) failed to calculate for the following reasons:    The 2019 ASCVD risk score is only valid for ages 40 to 79    Plan:     Assessment & Plan    BP improved from 160 to high 140s, but still not at goal.  Ruled out significant sleep apnea as cause of HTN based on recent sleep study results.  Renal function and other lab results normal.  Current medication regimen of amlodipine, triamterene, losartan, and hydralazine should be " sufficient.    ESSENTIAL HYPERTENSION:  - Monitored the patient's blood pressure, which was previously 160 and now in high 140s with normal diastolic readings.  - Evaluated blood pressure, which remains elevated at 140s/normal despite current medication regimen.  - Assessed lab results, including kidney function tests, which are within normal limits.  - Increased hydralazine dose from 10 mg to 25 mg for better BP control.  - Educated the patient about hydralazine's mechanism of action as a vessel relaxant and potential side effects, including dizziness.  - Continued amlodipine, triamterene, and losartan at current doses.  - Ruled out kidney failure as possible cause of hypertension.  - Recommend DASH diet specifically for hypertension management.  - Advised on weight loss strategies.  - Instructed the patient to continue monitoring blood pressure regularly.  - Explained DASH diet specifically for hypertension management and educated on the impact of weight loss on BP control.  - Mr. Hutchinosn to implement DASH diet principles, focusing on whole grains, fruits, and vegetables.    SLEEP APNEA:  - Reviewed recent sleep study conducted within the last 6 months.  - Evaluated sleep study results, which showed approximately 2 occurrences of apnea per hour during sleep, not considered clinically significant.  - Noted patient reports mild nocturnal snoring.  - Assessed that sleep apnea is not a significant factor in the patient's hypertension based on recent sleep study results.  - Ruled out sleep apnea as possible cause of hypertension.    FOLLOW-UP:  - Scheduled follow-up visit in 2 months to assess progression.         1. Essential hypertension  - losartan (COZAAR) 100 MG tablet; Take 1 tablet (100 mg total) by mouth once daily.  Dispense: 90 tablet; Refill: 3  - amLODIPine (NORVASC) 10 MG tablet; Take 1 tablet (10 mg total) by mouth once daily.  Dispense: 90 tablet; Refill: 3  - chlorthalidone (HYGROTEN) 50 MG Tab; Take 1  tablet (50 mg total) by mouth once daily.  Dispense: 90 tablet; Refill: 3  - hydrALAZINE (APRESOLINE) 25 MG tablet; Take 1 tablet (25 mg total) by mouth 3 (three) times daily.  Dispense: 90 tablet; Refill: 2    2. Uncontrolled hypertension  - losartan (COZAAR) 100 MG tablet; Take 1 tablet (100 mg total) by mouth once daily.  Dispense: 90 tablet; Refill: 3  - amLODIPine (NORVASC) 10 MG tablet; Take 1 tablet (10 mg total) by mouth once daily.  Dispense: 90 tablet; Refill: 3  - chlorthalidone (HYGROTEN) 50 MG Tab; Take 1 tablet (50 mg total) by mouth once daily.  Dispense: 90 tablet; Refill: 3  - hydrALAZINE (APRESOLINE) 25 MG tablet; Take 1 tablet (25 mg total) by mouth 3 (three) times daily.  Dispense: 90 tablet; Refill: 2      Immunization History   Administered Date(s) Administered    Td - PF (ADULT) 11/21/2022       No orders of the defined types were placed in this encounter.      Portions of this note were generated by [employer].    Each patient to whom medical services by telemedicine are provided:  (1) informed of the relationship between the physician and patient and the respective role of any other health care provider with respect to management of the patient; and (2) notified that he or she may decline to receive medical services by telemedicine and may withdraw from such care at any time.    I spent a total of 32 minutes face to face and non-face to face on the date of this visit.This includes time preparing to see the patient (eg, review of tests, notes), obtaining and/or reviewing additional history from an independent historian and/or outside medical records, documenting clinical information in the electronic health record, independently interpreting results and/or communicating results to the patient/family/caregiver, or care coordinator.  Visit today included increased complexity associated with the care of the episodic problem addressed and managing the longitudinal care of the patient due to the  serious and/or complex managed problem(s).      This note was generated with the assistance of ambient listening technology. Verbal consent was obtained by the patient and accompanying visitor(s) for the recording of patient appointment to facilitate this note. I attest to having reviewed and edited the generated note for accuracy, though some syntax or spelling errors may persist. Please contact the author of this note for any clarification.      Noman Byrd MD         [1]   Current Outpatient Medications on File Prior to Visit   Medication Sig Dispense Refill    [DISCONTINUED] amLODIPine (NORVASC) 10 MG tablet Take 1 tablet (10 mg total) by mouth once daily. 90 tablet 0    [DISCONTINUED] chlorthalidone (HYGROTEN) 50 MG Tab Take 1 tablet (50 mg total) by mouth once daily. 30 tablet 1    [DISCONTINUED] hydrALAZINE (APRESOLINE) 10 MG tablet Take 1 tablet (10 mg total) by mouth 3 (three) times daily. 90 tablet 0    [DISCONTINUED] losartan (COZAAR) 100 MG tablet Take 1 tablet (100 mg total) by mouth once daily. 90 tablet 0     No current facility-administered medications on file prior to visit.

## 2025-05-21 DIAGNOSIS — I10 ESSENTIAL HYPERTENSION: ICD-10-CM

## 2025-05-21 DIAGNOSIS — I10 UNCONTROLLED HYPERTENSION: ICD-10-CM

## 2025-06-03 DIAGNOSIS — I10 UNCONTROLLED HYPERTENSION: ICD-10-CM

## 2025-06-03 DIAGNOSIS — I10 ESSENTIAL HYPERTENSION: ICD-10-CM

## 2025-06-03 RX ORDER — HYDRALAZINE HYDROCHLORIDE 25 MG/1
25 TABLET, FILM COATED ORAL 3 TIMES DAILY
Qty: 90 TABLET | Refills: 2 | Status: SHIPPED | OUTPATIENT
Start: 2025-06-03 | End: 2026-06-03

## 2025-08-05 ENCOUNTER — PATIENT MESSAGE (OUTPATIENT)
Dept: CARDIOLOGY | Facility: CLINIC | Age: 35
End: 2025-08-05
Payer: COMMERCIAL

## 2025-08-05 ENCOUNTER — OFFICE VISIT (OUTPATIENT)
Dept: INTERNAL MEDICINE | Facility: CLINIC | Age: 35
End: 2025-08-05
Payer: COMMERCIAL

## 2025-08-05 VITALS
HEIGHT: 70 IN | BODY MASS INDEX: 45.1 KG/M2 | WEIGHT: 315 LBS | SYSTOLIC BLOOD PRESSURE: 160 MMHG | TEMPERATURE: 98 F | OXYGEN SATURATION: 97 % | DIASTOLIC BLOOD PRESSURE: 108 MMHG | HEART RATE: 75 BPM

## 2025-08-05 DIAGNOSIS — R06.83 LOUD SNORING: Primary | ICD-10-CM

## 2025-08-05 DIAGNOSIS — I10 ESSENTIAL HYPERTENSION: ICD-10-CM

## 2025-08-05 DIAGNOSIS — I10 UNCONTROLLED HYPERTENSION: ICD-10-CM

## 2025-08-05 PROCEDURE — 99214 OFFICE O/P EST MOD 30 MIN: CPT | Mod: S$GLB,,,

## 2025-08-05 PROCEDURE — 3008F BODY MASS INDEX DOCD: CPT | Mod: CPTII,S$GLB,,

## 2025-08-05 PROCEDURE — 99999 PR PBB SHADOW E&M-EST. PATIENT-LVL IV: CPT | Mod: PBBFAC,,,

## 2025-08-05 PROCEDURE — 3077F SYST BP >= 140 MM HG: CPT | Mod: CPTII,S$GLB,,

## 2025-08-05 PROCEDURE — 3044F HG A1C LEVEL LT 7.0%: CPT | Mod: CPTII,S$GLB,,

## 2025-08-05 PROCEDURE — 3080F DIAST BP >= 90 MM HG: CPT | Mod: CPTII,S$GLB,,

## 2025-08-05 PROCEDURE — 4010F ACE/ARB THERAPY RXD/TAKEN: CPT | Mod: CPTII,S$GLB,,

## 2025-08-05 PROCEDURE — G2211 COMPLEX E/M VISIT ADD ON: HCPCS | Mod: S$GLB,,,

## 2025-08-05 RX ORDER — HYDRALAZINE HYDROCHLORIDE 50 MG/1
50 TABLET, FILM COATED ORAL 3 TIMES DAILY
Qty: 90 TABLET | Refills: 2 | Status: SHIPPED | OUTPATIENT
Start: 2025-08-05 | End: 2026-08-05

## 2025-08-05 NOTE — PROGRESS NOTES
Patient ID: Corona Hutchinson is a 35 y.o. male.    Chief Complaint: Snoring (Sleep Study?)    History of Present Illness    CHIEF COMPLAINT:  - Mr. Hutchinson presents for follow-up of hypertension and discussion of recent ankle injury.    HPI:  Mr. Hutchinson reports ongoing issues with high blood pressure despite medication adherence. His blood pressure readings have been fluctuating significantly, unable to achieve readings below 140. Initially, it stabilized in the high 130s after starting treatment, but has since increased to the 160s/100s range. He is currently taking four medications for hypertension: Losartan 100 mg, Amlodipine 10 mg, Chlorthalidone 50 mg, and Hydralazine 25 mg, taken between 8:00 and 9:00 AM daily. He exercises 2-3 miles every day in an attempt to lower his blood pressure. He mentions having an upcoming physical.    Mr. Hutchinson reports a recent ankle injury since his last visit in March, diagnosed as an ankle sprain with two ligament tears. He was evaluated by an orthopedist for this injury.    He mentions recent snoring, which may be relevant to his hypertension management.    He denies chest pain, shortness of breath, observations of stopped breathing, or choking while sleeping.      ROS:  General: -fever, -chills, -fatigue, -weight gain, -weight loss  Eyes: -vision changes, -redness, -discharge  ENT: -ear pain, -nasal congestion, -sore throat  Cardiovascular: -chest pain, -palpitations, -lower extremity edema  Respiratory: -cough, -shortness of breath, +snoring  Gastrointestinal: -abdominal pain, -nausea, -vomiting, -diarrhea, -constipation, -blood in stool  Genitourinary: -dysuria, -hematuria, -frequency  Musculoskeletal: -joint pain, -muscle pain  Skin: -rash, -lesion  Neurological: -headache, -dizziness, -numbness, -tingling  Psychiatric: -anxiety, -depression, -sleep difficulty         Pmh, Psh, Family Hx, Social Hx updated in Epic Tabs today.         3/31/2025     8:30 AM 2/5/2025     3:35 PM    Depression Patient Health Questionnaire   Over the last two weeks how often have you been bothered by little interest or pleasure in doing things Not at all Not at all   Over the last two weeks how often have you been bothered by feeling down, depressed or hopeless Not at all Not at all   PHQ-2 Total Score 0 0       Active Problem List with Overview Notes    Diagnosis Date Noted    Essential hypertension 03/31/2025       Past Medical History:   Diagnosis Date    Amblyopia        Past Surgical History:   Procedure Laterality Date    REDUCTION OF TESTICULAR TORSION         Family History   Problem Relation Name Age of Onset    Hypertension Mother         Social History     Socioeconomic History    Marital status:    Tobacco Use    Smoking status: Never    Smokeless tobacco: Never   Substance and Sexual Activity    Alcohol use: Not Currently     Comment: seldom    Drug use: Never    Sexual activity: Yes     Social Drivers of Health     Financial Resource Strain: Low Risk  (2/12/2025)    Overall Financial Resource Strain (CARDIA)     Difficulty of Paying Living Expenses: Not very hard   Food Insecurity: No Food Insecurity (2/12/2025)    Hunger Vital Sign     Worried About Running Out of Food in the Last Year: Never true     Ran Out of Food in the Last Year: Never true   Transportation Needs: No Transportation Needs (2/12/2025)    PRAPARE - Transportation     Lack of Transportation (Medical): No     Lack of Transportation (Non-Medical): No   Physical Activity: Sufficiently Active (2/12/2025)    Exercise Vital Sign     Days of Exercise per Week: 5 days     Minutes of Exercise per Session: 60 min   Stress: No Stress Concern Present (2/12/2025)    [employer] - Occupational Stress Questionnaire     Feeling of Stress : Not at all   Housing Stability: Low Risk  (2/12/2025)    Housing Stability Vital Sign     Unable to Pay for Housing in the Last Year: No     Homeless in the Last Year: No  "      Medications Ordered Prior to Encounter[1]    Review of patient's allergies indicates:  No Known Allergies    General - Well developed, alert and oriented in NAD  HEENT - normocephalic, no evidence of trauma, sclera white, EOMI  Neck - full range of motion  COR - regular rate and rhythm without murmurs or gallops  Lungs - Clear  Abdomen - soft, non-tender  Ext - no cyanosis     Assessment:     1. Loud snoring    2. Essential hypertension    3. Uncontrolled hypertension        Pertinent Labs:    Chemistry        Component Value Date/Time     02/05/2025 1604    K 3.5 02/05/2025 1604     02/05/2025 1604    CO2 25 02/05/2025 1604    BUN 15 02/05/2025 1604    CREATININE 1.2 02/05/2025 1604    GLU 88 02/05/2025 1604        Component Value Date/Time    CALCIUM 9.4 02/05/2025 1604    ALKPHOS 62 02/05/2025 1604    AST 27 02/05/2025 1604    ALT 25 02/05/2025 1604    BILITOT 0.5 02/05/2025 1604          Lab Results   Component Value Date    WBC 5.02 02/05/2025    HGB 13.9 (L) 02/05/2025    HCT 41.8 02/05/2025    MCV 93 02/05/2025    MCH 30.8 02/05/2025    MCHC 33.3 02/05/2025    RDW 12.8 02/05/2025     02/05/2025    MPV 9.8 02/05/2025       Lab Results   Component Value Date    HGBA1C 5.6 02/05/2025    HGBA1C 5.5 11/21/2022    GLU 88 02/05/2025     Lab Results   Component Value Date    LDLCALC 123.2 02/05/2025     Lab Results   Component Value Date    TSH 0.956 02/05/2025     Lab Results   Component Value Date    CHOL 185 02/05/2025    CHOL 184 11/21/2022     Lab Results   Component Value Date    TRIG 149 02/05/2025    TRIG 60 11/21/2022     Lab Results   Component Value Date    HDL 32 (L) 02/05/2025    HDL 42 11/21/2022     Lab Results   Component Value Date    LDLCALC 123.2 02/05/2025    LDLCALC 130.0 11/21/2022     No results found for: "NONHDLC"  Lab Results   Component Value Date    CHOLHDL 17.3 (L) 02/05/2025    CHOLHDL 22.8 11/21/2022       The ASCVD Risk score (Sindy THAO, et al., 2019) failed to " calculate for the following reasons:    The 2019 ASCVD risk score is only valid for ages 40 to 79    Plan:     Assessment & Plan    BP remains elevated despite maximum doses of losartan, amlodipine, and chlorthalidone.  Considered resistant HTN; potential causes include kidney issues, sleep apnea, and cardiomyopathy.  Increased hydralazine dose from 25 mg to 50 mg daily.    RENOVASCULAR HYPERTENSION:  - Mr. Hutchinson's blood pressure remains elevated (160s/100s) despite current regimen.  - Assessment indicates resistant hypertension, possibly due to renal issues, sleep apnea, or cardiac problems.  - Kidney function was within normal limits at last visit.  - Discussed the significant impact of weight loss on BP control, emphasizing its greater effectiveness compared to other lifestyle modifications.  - Advised on maintaining a low sodium diet by avoiding salt, fried foods, and processed foods.  - Continuing Losartan 100 mg daily, Amlodipine 10 mg daily, and Chlorthalidone 50 mg daily.  - Increasing Hydralazine dose from 25 mg to 50 mg.  - Ordered renal ultrasound to rule out renal causes of resistant hypertension.  - Mr. Hutchinson to continue exercise regimen and focus on weight loss efforts.    LEFT ANKLE SPRAIN:  - Monitored patient's left ankle sprain with tearing of 2 ligaments.  - Mr. Hutchinson has seen an orthopedist for evaluation and management.    OBSTRUCTIVE SLEEP APNEA:  - Mr. Hutchinson reports increased snoring recently, which may be contributing to resistant hypertension.  - Ordered home sleep study to evaluate for sleep apnea.    FOLLOW-UP:  - Follow up after cardiology evaluation (referral placed for assessment of resistant hypertension and possible cardiac imaging) and completion of ordered tests.         1. Essential hypertension  2. Uncontrolled hypertension  On Amlodipine 10 mg daily, losartan 100 mg daily, chlorthalidone 50 mg daily, hydralazine 50 mg tid.  Increased dose of hydralazine from 25 to 50 mg tid this  visit.  Home Blood pressure readings improved to 130s systolic and 80s diastolic on previous visit about 3 months back.  - hydrALAZINE (APRESOLINE) 50 MG tablet; Take 1 tablet (50 mg total) by mouth 3 (three) times daily.  Dispense: 90 tablet; Refill: 2  - Ambulatory referral/consult to Cardiology; Future  -  Renal Artery Stenosis [employer]; Future  - Home Sleep Study; Future    3. Loud snoring  - Home Sleep Study; Future  STOP-Bang questionnaire score 6  Yes No Snoring?  Do you snore loudly (loud enough to be heard through closed doors, or your bed partner elbows you for snoring at night)?   Yes No Tired?  Do you often feel tired, fatigued, or sleepy during the daytime (such as falling asleep during driving)?   Yes No Observed?  Has anyone observed you stop breathing or choking/gasping during your sleep?   Yes No Pressure?  Do you have or are being treated for high blood pressure?   Yes No Body mass index more than 35 kg/m2?    No Age older than 50 years old?   Yes No Neck size large? (measured around Eduardo's apple)  For male, is your shirt collar 17 inches or larger?  For female, is your shirt collar 16 inches or larger?   Yes No Gender = Male?   Scoring criteria:   Low risk of LUDIVINA: Yes to 0 to 2 questions   Intermediate risk of LUDIVINA: Yes to 3 to 4 questions   High risk of LUDIVINA: Yes to 5 to 8 questions  or Yes to 2 or more of 4 STOP questions + male gender   or Yes to 2 or more of 4 STOP questions + BMI > 35 kg/m2   or Yes to 2 or more of 4 STOP questions + neck circumference       Immunization History   Administered Date(s) Administered    Td - PF (ADULT) 11/21/2022       Orders Placed This Encounter   Procedures    US Renal Artery Stenosis [employer]    Ambulatory referral/consult to Cardiology    Home Sleep Study       Portions of this note were generated by [employer].    Each patient to whom medical services by telemedicine are provided:  (1) informed of the relationship between the physician and patient  and the respective role of any other health care provider with respect to management of the patient; and (2) notified that he or she may decline to receive medical services by telemedicine and may withdraw from such care at any time.    Visit today included increased complexity associated with the care of the episodic problem addressed and managing the longitudinal care of the patient due to the serious and/or complex managed problem(s).      This note was generated with the assistance of ambient listening technology. Verbal consent was obtained by the patient and accompanying visitor(s) for the recording of patient appointment to facilitate this note. I attest to having reviewed and edited the generated note for accuracy, though some syntax or spelling errors may persist. Please contact the author of this note for any clarification.      Noman Byrd MD         [1]   Current Outpatient Medications on File Prior to Visit   Medication Sig Dispense Refill    amLODIPine (NORVASC) 10 MG tablet Take 1 tablet (10 mg total) by mouth once daily. 90 tablet 3    chlorthalidone (HYGROTEN) 50 MG Tab Take 1 tablet (50 mg total) by mouth once daily. 90 tablet 3    losartan (COZAAR) 100 MG tablet Take 1 tablet (100 mg total) by mouth once daily. 90 tablet 3    [DISCONTINUED] hydrALAZINE (APRESOLINE) 25 MG tablet Take 1 tablet (25 mg total) by mouth 3 (three) times daily. 90 tablet 2     No current facility-administered medications on file prior to visit.

## 2025-08-06 ENCOUNTER — TELEPHONE (OUTPATIENT)
Dept: SLEEP MEDICINE | Facility: CLINIC | Age: 35
End: 2025-08-06
Payer: COMMERCIAL

## 2025-08-08 ENCOUNTER — HOSPITAL ENCOUNTER (OUTPATIENT)
Dept: RADIOLOGY | Facility: HOSPITAL | Age: 35
Discharge: HOME | End: 2025-08-08
Payer: COMMERCIAL

## 2025-08-08 DIAGNOSIS — I10 UNCONTROLLED HYPERTENSION: ICD-10-CM

## 2025-08-08 PROCEDURE — 76770 US EXAM ABDO BACK WALL COMP: CPT | Mod: TC

## 2025-08-12 DIAGNOSIS — I10 ESSENTIAL HYPERTENSION: Primary | ICD-10-CM

## 2025-08-18 ENCOUNTER — TELEPHONE (OUTPATIENT)
Dept: CARDIOLOGY | Facility: CLINIC | Age: 35
End: 2025-08-18
Payer: COMMERCIAL

## 2025-08-24 DIAGNOSIS — I10 ESSENTIAL HYPERTENSION: ICD-10-CM

## 2025-08-24 DIAGNOSIS — I10 UNCONTROLLED HYPERTENSION: ICD-10-CM

## 2025-08-25 RX ORDER — CHLORTHALIDONE 50 MG/1
50 TABLET ORAL DAILY
Qty: 90 TABLET | Refills: 3 | Status: SHIPPED | OUTPATIENT
Start: 2025-08-25 | End: 2026-08-25

## 2025-08-25 RX ORDER — AMLODIPINE BESYLATE 10 MG/1
10 TABLET ORAL DAILY
Qty: 90 TABLET | Refills: 3 | Status: SHIPPED | OUTPATIENT
Start: 2025-08-25 | End: 2026-08-20

## 2025-08-25 RX ORDER — LOSARTAN POTASSIUM 100 MG/1
100 TABLET ORAL DAILY
Qty: 90 TABLET | Refills: 3 | Status: SHIPPED | OUTPATIENT
Start: 2025-08-25 | End: 2026-08-20

## 2025-08-29 ENCOUNTER — HOSPITAL ENCOUNTER (OUTPATIENT)
Dept: SLEEP MEDICINE | Facility: HOSPITAL | Age: 35
Discharge: HOME | End: 2025-08-29
Payer: COMMERCIAL

## 2025-09-01 PROBLEM — I10 UNCONTROLLED HYPERTENSION: Status: ACTIVE | Noted: 2025-09-01
